# Patient Record
Sex: MALE | Race: WHITE | NOT HISPANIC OR LATINO | Employment: UNEMPLOYED | URBAN - METROPOLITAN AREA
[De-identification: names, ages, dates, MRNs, and addresses within clinical notes are randomized per-mention and may not be internally consistent; named-entity substitution may affect disease eponyms.]

---

## 2017-01-19 ENCOUNTER — HOSPITAL ENCOUNTER (EMERGENCY)
Facility: HOSPITAL | Age: 22
Discharge: HOME | End: 2017-01-19
Attending: EMERGENCY MEDICINE
Payer: MEDICAID

## 2017-01-19 VITALS
WEIGHT: 140 LBS | TEMPERATURE: 98 F | RESPIRATION RATE: 18 BRPM | HEIGHT: 67 IN | BODY MASS INDEX: 21.97 KG/M2 | OXYGEN SATURATION: 98 % | HEART RATE: 84 BPM

## 2017-01-19 DIAGNOSIS — S99.919A ANKLE INJURY: ICD-10-CM

## 2017-01-19 DIAGNOSIS — S91.001A OPEN WOUND OF RIGHT ANKLE, INITIAL ENCOUNTER: Primary | ICD-10-CM

## 2017-01-19 PROCEDURE — 36000 PLACE NEEDLE IN VEIN: CPT

## 2017-01-19 PROCEDURE — 25000003 PHARM REV CODE 250: Performed by: PHYSICIAN ASSISTANT

## 2017-01-19 PROCEDURE — 99284 EMERGENCY DEPT VISIT MOD MDM: CPT | Mod: 25

## 2017-01-19 PROCEDURE — 85025 COMPLETE CBC W/AUTO DIFF WBC: CPT

## 2017-01-19 PROCEDURE — 80053 COMPREHEN METABOLIC PANEL: CPT

## 2017-01-19 PROCEDURE — 86140 C-REACTIVE PROTEIN: CPT

## 2017-01-19 PROCEDURE — 99284 EMERGENCY DEPT VISIT MOD MDM: CPT | Mod: ,,, | Performed by: EMERGENCY MEDICINE

## 2017-01-19 PROCEDURE — 85651 RBC SED RATE NONAUTOMATED: CPT

## 2017-01-19 RX ORDER — NAPROXEN 500 MG/1
500 TABLET ORAL 2 TIMES DAILY PRN
Qty: 20 TABLET | Refills: 0 | OUTPATIENT
Start: 2017-01-19 | End: 2020-11-09

## 2017-01-19 RX ORDER — SULFAMETHOXAZOLE AND TRIMETHOPRIM 800; 160 MG/1; MG/1
1 TABLET ORAL 2 TIMES DAILY
Qty: 28 TABLET | Refills: 0 | Status: SHIPPED | OUTPATIENT
Start: 2017-01-19 | End: 2017-02-02

## 2017-01-19 RX ORDER — OXYCODONE AND ACETAMINOPHEN 5; 325 MG/1; MG/1
1 TABLET ORAL
Status: COMPLETED | OUTPATIENT
Start: 2017-01-19 | End: 2017-01-19

## 2017-01-19 RX ADMIN — OXYCODONE HYDROCHLORIDE AND ACETAMINOPHEN 1 TABLET: 5; 325 TABLET ORAL at 10:01

## 2017-01-19 NOTE — ED AVS SNAPSHOT
[facility]  [address]  [facility] [address]               Chinedu Deluca   2017 10:14 PM   ED    Description:  Male : 1995   Department:  [facility]           Your Care was Coordinated By:     Provider Role From To    Denise Sharpe MD Attending Provider 17 [phone] --    CIERA Curiel Physician Assistant 17 [phone] --      Reason for Visit     Ankle Pain           Diagnoses this Visit        Comments    Open wound of right ankle, initial encounter    -  Primary     Ankle injury           ED Disposition     None           To Do List           Follow-up Information     Schedule an appointment as soon as possible for a visit with [facility].    Specialty:  Orthopedics    Contact information:    [address]  [facility] [address]  [phone]        Schedule an appointment as soon as possible for a visit with [facility].    Contact information:    [address] [facility] [address]  [phone]         These Medications        Disp Refills Start End    sulfamethoxazole-trimethoprim 800-160mg (BACTRIM DS) 800-160 mg Tab 28 tablet 0 2017    Take 1 tablet by mouth 2 (two) times daily. - Oral    naproxen (NAPROSYN) 500 MG tablet 20 tablet 0 2017     Take 1 tablet (500 mg total) by mouth 2 (two) times daily as needed (pain). - Oral      [facility]     [facility] On Call Nurse Care Line -  Assistance  Registered nurses in the [facility] On Call Center provide clinical advisement, health education, appointment booking, and other advisory services.  Call for this free service at [phone].             Medications           Message regarding Medications     Verify the changes and/or additions to your medication regime listed below are the same as discussed with your clinician today.  If any of these changes or additions are incorrect, please notify your  "healthcare provider.        START taking these NEW medications        Refills    sulfamethoxazole-trimethoprim 800-160mg (BACTRIM DS) 800-160 mg Tab 0    Sig: Take 1 tablet by mouth 2 (two) times daily.    Class: Print    Route: Oral    naproxen (NAPROSYN) 500 MG tablet 0    Sig: Take 1 tablet (500 mg total) by mouth 2 (two) times daily as needed (pain).    Class: Print    Route: Oral      These medications were administered today        Dose Freq    oxycodone-acetaminophen 5-325 mg per tablet 1 tablet 1 tablet ED 1 Time    Sig: Take 1 tablet by mouth ED 1 Time.    Class: Normal    Route: Oral           Verify that the below list of medications is an accurate representation of the medications you are currently taking.  If none reported, the list may be blank. If incorrect, please contact your healthcare provider. Carry this list with you in case of emergency.           Current Medications     naproxen (NAPROSYN) 500 MG tablet Take 1 tablet (500 mg total) by mouth 2 (two) times daily as needed (pain).    sulfamethoxazole-trimethoprim 800-160mg (BACTRIM DS) 800-160 mg Tab Take 1 tablet by mouth 2 (two) times daily.           Clinical Reference Information           Your Vitals Were     Pulse Temp Resp Height Weight SpO2    84 98.4 °F (36.9 °C) (Oral) 18 5' 7" (1.702 m) 63.5 kg (140 lb) 98%    BMI                21.93 kg/m2          Allergies as of 1/19/2017     No Known Allergies      Immunizations Administered on Date of Encounter - 1/19/2017     None      ED Micro, Lab, POCT     Start Ordered       Status Ordering Provider    01/19/17 2233 01/19/17 2232  CBC auto differential  STAT      In process     01/19/17 2233 01/19/17 2232  Comprehensive metabolic panel  STAT      In process     01/19/17 2233 01/19/17 2232  C-reactive protein  STAT      In process     01/19/17 2233 01/19/17 2232  Sedimentation rate, manual  STAT      In process       ED Imaging Orders     Start Ordered       Status Ordering Provider    " 01/19/17 2228 01/19/17 2227  X-Ray Ankle Complete Right  1 time imaging      In process       [facility] Sign-Up     Activating your [facility] account is as easy as 1-2-3!     1) Visit [email or web address], select Sign Up Now, enter this activation code and your date of birth, then select Next.  [email or web address]  Expires: 3/5/2017 11:44 PM      2) Create a username and password to use when you visit [facility] in the future and select a security question in case you lose your password and select Next.    3) Enter your e-mail address and click Sign Up!    Additional Information  If you have questions, please e-mail [email or web address] or call [phone] to talk to our [facility] staff. Remember, [facility] is NOT to be used for urgent needs. For medical emergencies, dial 911.          [facility] complies with applicable Federal civil rights laws and does not discriminate on the basis of race, color, national origin, age, disability, or sex.        Language Assistance Services     ATTENTION: Language assistance services are available, free of charge. Please call [phone].      ATENCIÓN: Si hirenla chelita, tiene a mccormick disposición servicios gratuitos de asistencia lingüística. Llame al [phone].     CHÚ Ý: N?u b?n nói Ti?ng Vi?t, có các d?ch v? h? tr? ngôn ng? mi?n phí dành cho b?n. G?i s? [phone].

## 2017-01-20 LAB
ALBUMIN SERPL BCP-MCNC: 4 G/DL
ALP SERPL-CCNC: 82 U/L
ALT SERPL W/O P-5'-P-CCNC: 23 U/L
ANION GAP SERPL CALC-SCNC: 8 MMOL/L
AST SERPL-CCNC: 27 U/L
BASOPHILS # BLD AUTO: 0.03 K/UL
BASOPHILS NFR BLD: 0.2 %
BILIRUB SERPL-MCNC: 0.2 MG/DL
BUN SERPL-MCNC: 9 MG/DL
CALCIUM SERPL-MCNC: 9.8 MG/DL
CHLORIDE SERPL-SCNC: 97 MMOL/L
CO2 SERPL-SCNC: 31 MMOL/L
CREAT SERPL-MCNC: 0.9 MG/DL
CRP SERPL-MCNC: 20.2 MG/L
DIFFERENTIAL METHOD: ABNORMAL
EOSINOPHIL # BLD AUTO: 0.2 K/UL
EOSINOPHIL NFR BLD: 1.8 %
ERYTHROCYTE [DISTWIDTH] IN BLOOD BY AUTOMATED COUNT: 12.8 %
ERYTHROCYTE [SEDIMENTATION RATE] IN BLOOD BY WESTERGREN METHOD: 50 MM/HR
EST. GFR  (AFRICAN AMERICAN): >60 ML/MIN/1.73 M^2
EST. GFR  (NON AFRICAN AMERICAN): >60 ML/MIN/1.73 M^2
GLUCOSE SERPL-MCNC: 96 MG/DL
HCT VFR BLD AUTO: 40.7 %
HGB BLD-MCNC: 13.8 G/DL
LYMPHOCYTES # BLD AUTO: 0.9 K/UL
LYMPHOCYTES NFR BLD: 7.2 %
MCH RBC QN AUTO: 30.5 PG
MCHC RBC AUTO-ENTMCNC: 33.9 %
MCV RBC AUTO: 90 FL
MONOCYTES # BLD AUTO: 1.5 K/UL
MONOCYTES NFR BLD: 11.4 %
NEUTROPHILS # BLD AUTO: 10.3 K/UL
NEUTROPHILS NFR BLD: 78.9 %
PLATELET # BLD AUTO: 319 K/UL
PMV BLD AUTO: 9.7 FL
POTASSIUM SERPL-SCNC: 3.8 MMOL/L
PROT SERPL-MCNC: 8.5 G/DL
RBC # BLD AUTO: 4.53 M/UL
SODIUM SERPL-SCNC: 136 MMOL/L
WBC # BLD AUTO: 13.09 K/UL
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
## 2017-01-20 NOTE — ED NOTES
APPEARANCE: awake and alert in NAD.  SKIN: warm, dry and intact. No breakdown or bruising.  MUSCULOSKELETAL: Patient moving all extremities spontaneously, no obvious swelling or deformities noted. Ambulates independently.  RESPIRATORY: no shortness of breath. All breath sounds CTA bilaterally.  CARDIAC: heart tones normal. Regular rate and rhythm; 2+ distal pulses; no peripheral edema  ABDOMEN: S/ND/NT, normoactive bowel sounds present in all four quadrants. Normal stool pattern.  : voids spontaneously without difficulty.  Neurologic: AAO x 4; follows commands equal strength in all extremities; denies numbness/tingling.
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
## 2020-11-09 ENCOUNTER — HOSPITAL ENCOUNTER (EMERGENCY)
Facility: HOSPITAL | Age: 25
Discharge: HOME | End: 2020-11-09
Attending: EMERGENCY MEDICINE
Payer: COMMERCIAL

## 2020-11-09 VITALS
OXYGEN SATURATION: 100 % | TEMPERATURE: 98 F | SYSTOLIC BLOOD PRESSURE: 134 MMHG | BODY MASS INDEX: 24.79 KG/M2 | WEIGHT: 157.94 LBS | RESPIRATION RATE: 16 BRPM | HEART RATE: 62 BPM | DIASTOLIC BLOOD PRESSURE: 65 MMHG | HEIGHT: 67 IN

## 2020-11-09 DIAGNOSIS — S05.12XA PERIORBITAL CONTUSION OF LEFT EYE, INITIAL ENCOUNTER: ICD-10-CM

## 2020-11-09 DIAGNOSIS — H11.32 SUBCONJUNCTIVAL HEMORRHAGE OF LEFT EYE: Primary | ICD-10-CM

## 2020-11-09 PROCEDURE — 99282 EMERGENCY DEPT VISIT SF MDM: CPT | Mod: ER

## 2020-11-09 RX ORDER — ALBUTEROL SULFATE 1.25 MG/3ML
1.25 SOLUTION RESPIRATORY (INHALATION) EVERY 6 HOURS PRN
COMMUNITY

## 2020-11-09 NOTE — ED PROVIDER NOTES
Encounter Date: 11/9/2020       History     Chief Complaint   Patient presents with    Eye Injury     Left eye injury, hit eye on bunk of bed, injury happen last wed, denies vision loss, OTC meds taken PTA      Chief complaint left facial injury    History of present illness:  25-year-old male states that he struck his left periorbital region when he slipped and fell.  Struck against a metallic surface.  This occurred last Wednesday.  Severity of discomfort is mild.  He is mainly concerned about the discoloration in his eye and is not concerned significantly about the pain.  He has no visual difficulty.  There is no exacerbating or alleviating factors.  No other mitigating factors noted.  No complaints of epistaxis.  Location of the pain is the left zygomatic arch, also noted along the left periorbital rim but is mild degree.  No no accompanying symptoms of her neck pain or difficulty with his jaw.        Review of patient's allergies indicates:   Allergen Reactions    Tylenol [acetaminophen] Swelling     Past Medical History:   Diagnosis Date    Asthma      Past Surgical History:   Procedure Laterality Date    FOOT SURGERY       History reviewed. No pertinent family history.  Social History     Tobacco Use    Smoking status: Current Every Day Smoker     Packs/day: 0.50     Types: Cigarettes   Substance Use Topics    Alcohol use: No    Drug use: No     Review of Systems   Constitutional: Negative for activity change.   HENT: Positive for facial swelling (Mild left zygomatic arch). Negative for dental problem, ear pain, hearing loss, sinus pressure and sinus pain.    Eyes: Positive for pain ( left periorbital) and redness. Negative for discharge and itching.   Respiratory: Negative.    Cardiovascular: Negative for chest pain.   Gastrointestinal: Negative for nausea and vomiting.   Neurological: Negative for dizziness, tremors, seizures, facial asymmetry, speech difficulty and headaches.    Psychiatric/Behavioral: Negative.    All other systems reviewed and are negative.      Physical Exam     Initial Vitals [11/09/20 1120]   BP Pulse Resp Temp SpO2   134/65 66 18 97.6 °F (36.4 °C) 100 %      MAP       --         Physical Exam    Nursing note and vitals reviewed.  Constitutional: He appears well-developed and well-nourished. No distress.   HENT:   Head: Normocephalic.       Right Ear: External ear normal.   Left Ear: External ear normal.   Nose: Nose normal.   Mouth/Throat: Oropharynx is clear and moist. No oropharyngeal exudate.   Mild tenderness with no step-off to the left zygomatic arch.   Eyes: EOM are normal. Pupils are equal, round, and reactive to light. Right conjunctiva is not injected. Right conjunctiva has no hemorrhage. Left conjunctiva is not injected. Left conjunctiva has a hemorrhage. Right eye exhibits normal extraocular motion and no nystagmus. Left eye exhibits normal extraocular motion and no nystagmus. Right pupil is reactive. Left pupil is reactive.       Dense left subconjunctival hemorrhage.   Neck: Normal range of motion. Neck supple.   Cardiovascular: Normal rate, regular rhythm and intact distal pulses.   Pulmonary/Chest: No respiratory distress.   Musculoskeletal: Normal range of motion.   Neurological: He is alert and oriented to person, place, and time. He has normal strength. No cranial nerve deficit. GCS score is 15. GCS eye subscore is 4. GCS verbal subscore is 5. GCS motor subscore is 6.   Skin: Skin is warm and dry.         ED Course   Procedures  Labs Reviewed - No data to display       Imaging Results    None                                      Clinical Impression:                  ICD-10-CM ICD-9-CM   1. Subconjunctival hemorrhage of left eye  H11.32 372.72   2. Periorbital contusion of left eye, initial encounter  S05.12XA 921.1                      Deepak Hartmann MD  11/10/20 0633

## 2020-11-09 NOTE — ED NOTES
Aaox3, skin warm and dry, resp unlabored and even.davison well. Left eye with redness to sclera left side.

## 2022-02-14 ENCOUNTER — OFFICE VISIT (OUTPATIENT)
Dept: URGENT CARE | Facility: CLINIC | Age: 27
End: 2022-02-14
Payer: MEDICAID

## 2022-02-14 VITALS
HEIGHT: 67 IN | OXYGEN SATURATION: 98 % | HEART RATE: 85 BPM | BODY MASS INDEX: 21.97 KG/M2 | RESPIRATION RATE: 19 BRPM | SYSTOLIC BLOOD PRESSURE: 117 MMHG | DIASTOLIC BLOOD PRESSURE: 68 MMHG | TEMPERATURE: 99 F | WEIGHT: 140 LBS

## 2022-02-14 DIAGNOSIS — R51.9 HEADACHE ABOVE THE EYE REGION: Primary | ICD-10-CM

## 2022-02-14 DIAGNOSIS — G44.019 EPISODIC CLUSTER HEADACHE, NOT INTRACTABLE: ICD-10-CM

## 2022-02-14 LAB
CTP QC/QA: YES
SARS-COV-2 RDRP RESP QL NAA+PROBE: NEGATIVE

## 2022-02-14 PROCEDURE — 1160F PR REVIEW ALL MEDS BY PRESCRIBER/CLIN PHARMACIST DOCUMENTED: ICD-10-PCS | Mod: CPTII,S$GLB,,

## 2022-02-14 PROCEDURE — U0002 COVID-19 LAB TEST NON-CDC: HCPCS | Mod: QW,S$GLB,,

## 2022-02-14 PROCEDURE — 1160F RVW MEDS BY RX/DR IN RCRD: CPT | Mod: CPTII,S$GLB,,

## 2022-02-14 PROCEDURE — 3074F PR MOST RECENT SYSTOLIC BLOOD PRESSURE < 130 MM HG: ICD-10-PCS | Mod: CPTII,S$GLB,,

## 2022-02-14 PROCEDURE — 3008F PR BODY MASS INDEX (BMI) DOCUMENTED: ICD-10-PCS | Mod: CPTII,S$GLB,,

## 2022-02-14 PROCEDURE — 3078F PR MOST RECENT DIASTOLIC BLOOD PRESSURE < 80 MM HG: ICD-10-PCS | Mod: CPTII,S$GLB,,

## 2022-02-14 PROCEDURE — 3078F DIAST BP <80 MM HG: CPT | Mod: CPTII,S$GLB,,

## 2022-02-14 PROCEDURE — U0002: ICD-10-PCS | Mod: QW,S$GLB,,

## 2022-02-14 PROCEDURE — 99213 OFFICE O/P EST LOW 20 MIN: CPT | Mod: S$GLB,CS,,

## 2022-02-14 PROCEDURE — 3074F SYST BP LT 130 MM HG: CPT | Mod: CPTII,S$GLB,,

## 2022-02-14 PROCEDURE — 1159F PR MEDICATION LIST DOCUMENTED IN MEDICAL RECORD: ICD-10-PCS | Mod: CPTII,S$GLB,,

## 2022-02-14 PROCEDURE — 3008F BODY MASS INDEX DOCD: CPT | Mod: CPTII,S$GLB,,

## 2022-02-14 PROCEDURE — 99213 PR OFFICE/OUTPT VISIT, EST, LEVL III, 20-29 MIN: ICD-10-PCS | Mod: S$GLB,CS,,

## 2022-02-14 PROCEDURE — 1159F MED LIST DOCD IN RCRD: CPT | Mod: CPTII,S$GLB,,

## 2022-02-14 RX ORDER — IBUPROFEN 800 MG/1
800 TABLET ORAL 3 TIMES DAILY
Qty: 21 TABLET | Refills: 0 | Status: SHIPPED | OUTPATIENT
Start: 2022-02-14 | End: 2022-02-21

## 2022-02-14 NOTE — LETTER
February 14, 2022      [facility]  [address] [address]  Phone: [phone]  Fax: [phone]       Patient: Edvin Linares   YOB: 1995  Date of Visit: 02/14/2022    To Whom It May Concern:    Merry Linares  was at [facility] on 02/14/2022. He has tested negative for COVID-19 today. The patient may return to work/school on 02/15/22 with no restrictions. If you have any questions or concerns, or if I can be of further assistance, please do not hesitate to contact me.    Sincerely,    Savanah Vick PA-C

## 2022-02-15 NOTE — PROGRESS NOTES
"Subjective:       Patient ID: Edvin Linares is a 26 y.o. male.    Vitals:  height is 5' 7" (1.702 m) and weight is 63.5 kg (140 lb). His temperature is 98.6 °F (37 °C). His blood pressure is 117/68 and his pulse is 85. His respiration is 19 and oxygen saturation is 98%.     Chief Complaint: Headache    Patient presents for headache since yesterday. He states the headaches come and go. He has taken ibuprofen for symptoms which helps. Rates current headache a 4/10. He denies fever, body aches or chills. He needs covid test to return back to work.    Headache   This is a new problem. The current episode started in the past 7 days. The problem occurs constantly. The problem has been gradually worsening. The pain is located in the frontal region. The pain does not radiate. The pain quality is similar to prior headaches. The quality of the pain is described as throbbing. The pain is at a severity of 9/10. The pain is moderate. Pertinent negatives include no coughing, ear pain, eye pain, eye redness, fever, nausea, neck pain, photophobia, sinus pressure or vomiting. Nothing aggravates the symptoms. He has tried nothing for the symptoms. The treatment provided no relief.       Constitution: Negative for chills, sweating, fatigue and fever.   HENT: Negative for ear pain, congestion, sinus pain and sinus pressure.    Neck: Negative for neck pain and neck stiffness.   Eyes: Negative for eye itching, eye pain, eye redness and photophobia.   Respiratory: Negative for cough and sputum production.    Gastrointestinal: Negative for nausea, vomiting, constipation and diarrhea.   Skin: Negative for pale, rash and hives.   Allergic/Immunologic: Negative for hives and itching.   Neurological: Positive for headaches. Negative for disorientation and altered mental status.   Psychiatric/Behavioral: Negative for altered mental status, disorientation and confusion.       Objective:      Physical Exam   Constitutional: He is oriented to " person, place, and time. He appears well-developed and well-nourished.  Non-toxic appearance. He does not appear ill. No distress.      Comments:Patient sits comfortably in exam chair. Answers questions in complete sentences. Does not show any signs of distress or discoloration.      HENT:   Head: Normocephalic and atraumatic.   Ears:   Right Ear: Hearing, tympanic membrane, external ear and ear canal normal.   Left Ear: Hearing, tympanic membrane, external ear and ear canal normal.   Nose: Nose normal. No mucosal edema, rhinorrhea or nasal deformity. No epistaxis. Right sinus exhibits no maxillary sinus tenderness and no frontal sinus tenderness. Left sinus exhibits no maxillary sinus tenderness and no frontal sinus tenderness.   Mouth/Throat: Uvula is midline, oropharynx is clear and moist and mucous membranes are normal. No trismus in the jaw. Normal dentition. No uvula swelling. No posterior oropharyngeal erythema.   Eyes: Conjunctivae, EOM and lids are normal. Pupils are equal, round, and reactive to light. No scleral icterus.   Neck: Trachea normal and phonation normal. Neck supple. No neck rigidity present.   Cardiovascular: Normal rate, regular rhythm, normal heart sounds, intact distal pulses and normal pulses.   Pulmonary/Chest: Effort normal and breath sounds normal. No respiratory distress.   Abdominal: Normal appearance and bowel sounds are normal. He exhibits no distension. Soft. There is no abdominal tenderness.   Musculoskeletal: Normal range of motion.         General: No deformity or edema. Normal range of motion.   Neurological: no focal deficit. He is alert and oriented to person, place, and time. He has normal motor skills and intact cranial nerves. No cranial nerve deficit. He exhibits normal muscle tone. Gait normal. Coordination normal. GCS eye subscore is 4. GCS verbal subscore is 5. GCS motor subscore is 6.   Skin: Skin is warm, dry, intact, not diaphoretic and not pale.   Psychiatric: He  has a normal mood and affect. His speech is normal and behavior is normal. Judgment and thought content normal. Cognition and memory  Nursing note and vitals reviewed.        Results for orders placed or performed in visit on 02/14/22   POCT COVID-19 Rapid Screening   Result Value Ref Range    POC Rapid COVID Negative Negative     Acceptable Yes        Assessment:       1. Headache above the eye region    2. Episodic cluster headache, not intractable          Plan:         Headache above the eye region  -     Cancel: POCT COVID-19 Rapid Screening  -     POCT COVID-19 Rapid Screening    Episodic cluster headache, not intractable  -     ibuprofen (ADVIL,MOTRIN) 800 MG tablet; Take 1 tablet (800 mg total) by mouth 3 (three) times daily. for 7 days  Dispense: 21 tablet; Refill: 0                  Patient Instructions   - Rest.    - Drink plenty of fluids.    - Acetaminophen (tylenol) or Ibuprofen (advil,motrin) as directed as needed for fever/pain. Avoid tylenol if you have a history of liver disease. Do not take ibuprofen if you have a history of GI bleeding, kidney disease, or if you take blood thinners.     - You must understand that you have received an Urgent Care treatment only and that you may be released before all of your medical problems are known or treated.   - You, the patient, will arrange for follow up care as instructed.   - If your condition worsens or fails to improve we recommend that you receive another evaluation at the ER immediately or contact your PCP to discuss your concerns or return here.   - Follow up with your PCP or specialty clinic as directed in the next 1-2 weeks if not improved or as needed.  You can call [phone] to schedule an appointment with the appropriate provider.      If your symptoms do not improve or worsen, go to the emergency room immediately.  Patient Education       Headache, Adult   About this topic   Headache is the word used to describe aching or  pain in the head. There are many types of headaches. Some of them are:  · Headaches that are from an illness or injury. These may be caused from a virus or other infection. They can also happen when you do not get enough to drink.  · Tension headaches may cause mild to moderate pain. The pain may feel like it is squeezing, pressure, dull, or aching. You may have pain in the front, back, or both sides of head. Tension headaches are not often bad enough to keep you from doing daily activities. Some people may not feel like doing anything while they have the headache. Tension headaches can last from 30 minutes to 7 days.  · Migraine headaches may cause moderate to severe pain. The pain may throb on one or both sides of the head. These headaches often start off mild and get worse. You are often not able to do normal activities. This kind of headache may also have other signs with it like throwing up and not being able to be around light or sound.  · Cluster headaches are severe and happen again and again but for short periods of time. The pain is burning, sharp, and keeps hurting. The pain may happen behind or around your eye. It can also be on one side of your face. Signs can include a stuffed, runny nose and red, watery eye on the side of pain. They can happen because of drinking alcohol, smoking, heat, and bright lights. Some drugs can also cause this type of headache.  · A sinus headache is often either a migraine or tension headache. Sinusitis should not cause repeat headaches. If you have pain over your nose or sinuses, a fever and thick liquid coming from your nose, you may have a sinus infection.  · Medication overuse headaches can happen if you have had many headaches and have taken headache medicine to help with them.  Not all headaches need to be checked by a doctor. Some kinds may be a sign of a serious problem. Care for headaches will depend on what is causing them.  What are the causes?   · The exact cause  "of many headaches is not known and may be different for each kind of headache.  What are the main signs?   Signs are different for each kind of headache. Talk with your doctor about your signs.  How does the doctor diagnose this health problem?   Your doctor will take your history and do an exam. You will be asked to talk about your headaches. The doctor will want to know when they happen and how often you get them. It may be hard to find the exact cause. Your doctor may order:  · Lab tests  · CT or MRI scan  · Other tests to check for health problems that could be causing the headaches  Your doctor may also suggest you keep a headache diary to try to see if there is a pattern to your headaches.  How does the doctor treat this health problem?   Goals are to stop or lower the number of headaches, help ease your pain, and give you a better quality of life. The type of care that is best for you will depend on the type of headache you have. This may be:  · Self-care during the headache:  ? Cold compresses or ice packs  ? Massaging temples and neck  ? Deep breathing techniques  ? Warm shower  ? Sleeping  ? Drugs that don't need to be ordered by a doctor like acetaminophen or ibuprofen  ? Avoiding possible triggers     · Other care may be:  ? Acupuncture  ? Physical therapy  ? Massage if the pain is in the neck and shoulders  ? Botox  What lifestyle changes are needed?   Some things can lessen the chance for you to get a headache.  · You can take drugs like acetaminophen, ibuprofen, or naproxen for pain as instructed, but use of these pain medicines should be limited. If you need to take pain medicines every day for headaches, call your doctor.  · If possible, lie down in a quiet, dark room.  · Make sure you eat at regular times. Do not skip meals. Drink plenty of fluids. Be sure you are getting enough sleep.  · If you have frequent headaches that interfere with your activities, you can keep a "headache diary." This might " help to see if there is a pattern to your headaches. Make notes about:  ? Where your pain is on your head or neck.  ? When you have the pain and how long it lasts.  ? How your pain feels. Is it dull, sharp, burning, stabbing, or cramping?  ? What causes your pain?  ? What makes your pain better or worse?  What drugs may be needed?   Your doctor may order drugs based on the type of headache you have. The doctor may order drugs to:  · Help with pain  · Prevent or stop the headache  · Treat upset stomach and throwing up  · Treat high blood pressure  · Treat low mood  · Treat hormonal imbalance  What problems could happen?   Headache may be part of a more serious health problem.  What can be done to prevent this health problem?   · Know the things that may start your headache.  · Don't spend too much time in front of screens. This includes watching TV, using computers, and playing video games.  · Take drugs to keep from getting headaches. Your doctor may give you drugs to lower how long the headache lasts. This can also help lower how long you will have your headache.  · Hold the phone rather than resting it on your shoulder, or use a headset.  · Maintain good posture and exercise regularly to keep back, shoulder, and neck muscles strong.  Where can I learn more?   American Academy of Family Physicians  http://familydoctor.org/familydoctor/en/diseases-conditions/headaches.html   National [place] of Neurological Disorders and Stroke  https://www.ninds.nih.gov/Disorders/All-Disorders/Headache-Information-Page   NHS Choices  http://www.nhs.uk/conditions/headache/Pages/Introduction.aspx   Last Reviewed Date   2021-06-16  Consumer Information Use and Disclaimer   This information is not specific medical advice and does not replace information you receive from your health care provider. This is only a brief summary of general information. It does NOT include all information about conditions, illnesses, injuries, tests,  procedures, treatments, therapies, discharge instructions or life-style choices that may apply to you. You must talk with your health care provider for complete information about your health and treatment options. This information should not be used to decide whether or not to accept your health care providers advice, instructions or recommendations. Only your health care provider has the knowledge and training to provide advice that is right for you.  Copyright   Copyright © 2021 [employer] and its affiliates and/or licensors. All rights reserved.

## 2022-02-15 NOTE — PATIENT INSTRUCTIONS
- Rest.    - Drink plenty of fluids.    - Acetaminophen (tylenol) or Ibuprofen (advil,motrin) as directed as needed for fever/pain. Avoid tylenol if you have a history of liver disease. Do not take ibuprofen if you have a history of GI bleeding, kidney disease, or if you take blood thinners.     - You must understand that you have received an Urgent Care treatment only and that you may be released before all of your medical problems are known or treated.   - You, the patient, will arrange for follow up care as instructed.   - If your condition worsens or fails to improve we recommend that you receive another evaluation at the ER immediately or contact your PCP to discuss your concerns or return here.   - Follow up with your PCP or specialty clinic as directed in the next 1-2 weeks if not improved or as needed.  You can call [phone] to schedule an appointment with the appropriate provider.      If your symptoms do not improve or worsen, go to the emergency room immediately.  Patient Education       Headache, Adult   About this topic   Headache is the word used to describe aching or pain in the head. There are many types of headaches. Some of them are:  · Headaches that are from an illness or injury. These may be caused from a virus or other infection. They can also happen when you do not get enough to drink.  · Tension headaches may cause mild to moderate pain. The pain may feel like it is squeezing, pressure, dull, or aching. You may have pain in the front, back, or both sides of head. Tension headaches are not often bad enough to keep you from doing daily activities. Some people may not feel like doing anything while they have the headache. Tension headaches can last from 30 minutes to 7 days.  · Migraine headaches may cause moderate to severe pain. The pain may throb on one or both sides of the head. These headaches often start off mild and get worse. You are often not able to do normal activities. This  kind of headache may also have other signs with it like throwing up and not being able to be around light or sound.  · Cluster headaches are severe and happen again and again but for short periods of time. The pain is burning, sharp, and keeps hurting. The pain may happen behind or around your eye. It can also be on one side of your face. Signs can include a stuffed, runny nose and red, watery eye on the side of pain. They can happen because of drinking alcohol, smoking, heat, and bright lights. Some drugs can also cause this type of headache.  · A sinus headache is often either a migraine or tension headache. Sinusitis should not cause repeat headaches. If you have pain over your nose or sinuses, a fever and thick liquid coming from your nose, you may have a sinus infection.  · Medication overuse headaches can happen if you have had many headaches and have taken headache medicine to help with them.  Not all headaches need to be checked by a doctor. Some kinds may be a sign of a serious problem. Care for headaches will depend on what is causing them.  What are the causes?   · The exact cause of many headaches is not known and may be different for each kind of headache.  What are the main signs?   Signs are different for each kind of headache. Talk with your doctor about your signs.  How does the doctor diagnose this health problem?   Your doctor will take your history and do an exam. You will be asked to talk about your headaches. The doctor will want to know when they happen and how often you get them. It may be hard to find the exact cause. Your doctor may order:  · Lab tests  · CT or MRI scan  · Other tests to check for health problems that could be causing the headaches  Your doctor may also suggest you keep a headache diary to try to see if there is a pattern to your headaches.  How does the doctor treat this health problem?   Goals are to stop or lower the number of headaches, help ease your pain, and give you  "a better quality of life. The type of care that is best for you will depend on the type of headache you have. This may be:  · Self-care during the headache:  ? Cold compresses or ice packs  ? Massaging temples and neck  ? Deep breathing techniques  ? Warm shower  ? Sleeping  ? Drugs that don't need to be ordered by a doctor like acetaminophen or ibuprofen  ? Avoiding possible triggers     · Other care may be:  ? Acupuncture  ? Physical therapy  ? Massage if the pain is in the neck and shoulders  ? Botox  What lifestyle changes are needed?   Some things can lessen the chance for you to get a headache.  · You can take drugs like acetaminophen, ibuprofen, or naproxen for pain as instructed, but use of these pain medicines should be limited. If you need to take pain medicines every day for headaches, call your doctor.  · If possible, lie down in a quiet, dark room.  · Make sure you eat at regular times. Do not skip meals. Drink plenty of fluids. Be sure you are getting enough sleep.  · If you have frequent headaches that interfere with your activities, you can keep a "headache diary." This might help to see if there is a pattern to your headaches. Make notes about:  ? Where your pain is on your head or neck.  ? When you have the pain and how long it lasts.  ? How your pain feels. Is it dull, sharp, burning, stabbing, or cramping?  ? What causes your pain?  ? What makes your pain better or worse?  What drugs may be needed?   Your doctor may order drugs based on the type of headache you have. The doctor may order drugs to:  · Help with pain  · Prevent or stop the headache  · Treat upset stomach and throwing up  · Treat high blood pressure  · Treat low mood  · Treat hormonal imbalance  What problems could happen?   Headache may be part of a more serious health problem.  What can be done to prevent this health problem?   · Know the things that may start your headache.  · Don't spend too much time in front of screens. This " includes watching TV, using computers, and playing video games.  · Take drugs to keep from getting headaches. Your doctor may give you drugs to lower how long the headache lasts. This can also help lower how long you will have your headache.  · Hold the phone rather than resting it on your shoulder, or use a headset.  · Maintain good posture and exercise regularly to keep back, shoulder, and neck muscles strong.  Where can I learn more?   American Academy of Family Physicians  http://familydoctor.org/familydoctor/en/diseases-conditions/headaches.html   National [place] of Neurological Disorders and Stroke  https://www.ninds.nih.gov/Disorders/All-Disorders/Headache-Information-Page   NHS Choices  http://www.nhs.uk/conditions/headache/Pages/Introduction.aspx   Last Reviewed Date   2021-06-16  Consumer Information Use and Disclaimer   This information is not specific medical advice and does not replace information you receive from your health care provider. This is only a brief summary of general information. It does NOT include all information about conditions, illnesses, injuries, tests, procedures, treatments, therapies, discharge instructions or life-style choices that may apply to you. You must talk with your health care provider for complete information about your health and treatment options. This information should not be used to decide whether or not to accept your health care providers advice, instructions or recommendations. Only your health care provider has the knowledge and training to provide advice that is right for you.  Copyright   Copyright © 2021 [employer] and its affiliates and/or licensors. All rights reserved.

## 2025-04-12 ENCOUNTER — HOSPITAL ENCOUNTER (EMERGENCY)
Facility: HOSPITAL | Age: 30
Discharge: HOME | End: 2025-04-12
Attending: EMERGENCY MEDICINE
Payer: COMMERCIAL

## 2025-04-12 VITALS
TEMPERATURE: 98 F | HEART RATE: 76 BPM | HEIGHT: 67 IN | WEIGHT: 175 LBS | RESPIRATION RATE: 16 BRPM | OXYGEN SATURATION: 97 % | BODY MASS INDEX: 27.47 KG/M2 | DIASTOLIC BLOOD PRESSURE: 82 MMHG | SYSTOLIC BLOOD PRESSURE: 123 MMHG

## 2025-04-12 DIAGNOSIS — S09.90XA INJURY OF HEAD, INITIAL ENCOUNTER: Primary | ICD-10-CM

## 2025-04-12 PROCEDURE — 99284 EMERGENCY DEPT VISIT MOD MDM: CPT | Mod: 25
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
